# Patient Record
(demographics unavailable — no encounter records)

---

## 2025-01-14 NOTE — CONSULT LETTER
[FreeTextEntry1] : Ms. Hedrick is a very pleasant 35-year-old female patient who was seen in our office today in follow-up after suffering a T7 compression fracture approximately 6 weeks ago.  I am happy to report that the patient is currently doing well with conservative therapies.  The patient has been wearing her brace on and off but not consistently when she is upright.  The patient endorses improvement of her pain but still experiences pain in the midline and occasionally to the left and right.  The patient's pain is now rated at a 6-7/10 when present.  The patient's pain previously was so severe she was admitted to the hospital for a few days for pain management.  The patient continues to deny any difficulties walking.  The patient denies any neurologic symptoms in the lower extremities or a sensory level.  On examination, the patient is alert, oriented, and compliant with the exam.  The patient demonstrates full strength in the lower extremities bilaterally.  The patient ambulates well with a slightly kyphotic posture.  The patient demonstrates 2+ reflexes in the upper and lower extremities bilaterally.  The patient has mild tenderness in the thoracic region.  The patient is accompanied with an x-ray of the thoracic spine dated January 13, 2025.  These images demonstrate focal kyphosis across the compression fracture as expected at the T7 level.  These findings appear stable compared to the patient's previous CT scan from November 27, 2024.  Taken together, the patient has a clinical history and radiographic findings most consistent with a T7 compression fracture with routine healing.  At this time, I have recommended following up with physical therapy for muscular pain.  I explained to the patient that her mid back pain would continue to improve though it could take several weeks to months.  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  The patient has been provided a short course of tramadol to be used for symptomatic pain in the interim.  The patient has follow-up with us in approximately 6 weeks to reevaluate her progress and I look forward to seeing her back at that time.